# Patient Record
Sex: FEMALE | Race: OTHER | NOT HISPANIC OR LATINO | URBAN - METROPOLITAN AREA
[De-identification: names, ages, dates, MRNs, and addresses within clinical notes are randomized per-mention and may not be internally consistent; named-entity substitution may affect disease eponyms.]

---

## 2023-01-13 ENCOUNTER — EMERGENCY (EMERGENCY)
Facility: HOSPITAL | Age: 43
LOS: 1 days | Discharge: HOME | End: 2023-01-13
Attending: EMERGENCY MEDICINE
Payer: COMMERCIAL

## 2023-01-13 VITALS
OXYGEN SATURATION: 100 % | WEIGHT: 166.01 LBS | TEMPERATURE: 98 F | RESPIRATION RATE: 22 BRPM | SYSTOLIC BLOOD PRESSURE: 107 MMHG | DIASTOLIC BLOOD PRESSURE: 67 MMHG | HEIGHT: 60 IN | HEART RATE: 80 BPM

## 2023-01-13 PROCEDURE — 99284 EMERGENCY DEPT VISIT MOD MDM: CPT

## 2023-01-13 PROCEDURE — 71046 X-RAY EXAM CHEST 2 VIEWS: CPT | Mod: 26

## 2023-01-13 RX ORDER — SODIUM CHLORIDE 9 MG/ML
1000 INJECTION INTRAMUSCULAR; INTRAVENOUS; SUBCUTANEOUS ONCE
Refills: 0 | Status: COMPLETED | OUTPATIENT
Start: 2023-01-13 | End: 2023-01-13

## 2023-01-13 RX ORDER — IPRATROPIUM/ALBUTEROL SULFATE 18-103MCG
3 AEROSOL WITH ADAPTER (GRAM) INHALATION ONCE
Refills: 0 | Status: COMPLETED | OUTPATIENT
Start: 2023-01-13 | End: 2023-01-13

## 2023-01-13 RX ADMIN — Medication 3 MILLILITER(S): at 23:17

## 2023-01-13 RX ADMIN — Medication 125 MILLIGRAM(S): at 23:26

## 2023-01-13 RX ADMIN — SODIUM CHLORIDE 1000 MILLILITER(S): 9 INJECTION INTRAMUSCULAR; INTRAVENOUS; SUBCUTANEOUS at 23:26

## 2023-01-13 RX ADMIN — Medication 3 MILLILITER(S): at 23:05

## 2023-01-13 RX ADMIN — Medication 3 MILLILITER(S): at 23:31

## 2023-01-13 NOTE — ED PROVIDER NOTE - ATTENDING CONTRIBUTION TO CARE
Attending MD Kee:  I performed a history and physical exam of the patient and discussed their management with the resident. I reviewed the resident's note and agree with the documented findings and plan of care. My medical decision making and observations are found above. Attending MD Army:  I performed a history and physical exam of the patient and discussed their management with the resident. I reviewed the resident's note and agree with the documented findings and plan of care. My medical decision making and observations are found above.    -Jere Chandler MD-   sob  no wheeze  mdm: pt wo risk for pe, not obviously asthma, may be pulm htn / alt cause if not better w nebs  ekg wo stemi to my eye  xr wo pna to my eye  cont to re-assess

## 2023-01-13 NOTE — ED PROVIDER NOTE - PHYSICAL EXAMINATION
Gen: NAD, non-toxic appearing  Head: normal appearing  HEENT: normal conjunctiva  Lung: no respiratory distress, speaking in full sentences,   No orlando wheeze, felt to be moving poor air.     CV: regular rate and rhythm, no murmurs  Abd: soft, non distended, non tender   MSK: no visible deformities, No calf tenderness  Neuro: alert and grossly oriented, no gross motor deficits  Skin: No orlando rashes

## 2023-01-13 NOTE — ED PROVIDER NOTE - EKG ADDITIONAL INFORMATION FREE TEXT
Normal sinus rhythm.  Unremarkable intervals.  The P wave morphology is unremarkable.  The QRS morphology is unremarkable.  No rolando ischemic changes, there is nonspecific T wave flattening in lead III and aVL.  No evidence of old infarction.

## 2023-01-13 NOTE — ED PROVIDER NOTE - NSFOLLOWUPINSTRUCTIONS_ED_ALL_ED_FT
Follow-up with the pulmonology clinic.  You will receive a call in 24 to 48 hours to make this appointment.  Call the emergency department if you have difficulty obtaining this appointment.  The phone number for the emergency department can be found on the upper left-hand side of this paperwork.    Immediately return to the Emergency Department for any new or markedly worsening symptoms.     the prescription for prednisone sent to your pharmacy within the next 12 hours. Take all new and previous medications as prescribed.

## 2023-01-13 NOTE — ED PROVIDER NOTE - RAPID ASSESSMENT
42F w/ PMHx of asthma presents to ED c/o difficulty breathing since this past 3-4 hours. Pt used her pump twice and states she has no relief. Pt denies recent travel, steroids, smoking, being on birth control.     Patient was rapidly assessed via a rapid medical evaluation and/or role of Quick Triage Doctor; a limited history, physical exam and assessment was performed. The patient will be seen and further evaluated in the main emergency department. The remainder of care and evaluation will be conducted by the primary emergency medicine team. Receiving team will follow up on labs, imaging and serially reassess patient as indicated. All further decisions regarding patient care, evaluation and disposition are at the discretion of the receiving primary emergency department team. Seen by Jere Chandler (MD) and Khalida Alvarado). 42F w/ PMHx of asthma presents to ED c/o difficulty breathing since this past 3-4 hours. Pt used her pump twice and states she has no relief. Pt denies recent travel, steroids, smoking, being on birth control.     Patient was rapidly assessed via a rapid medical evaluation and/or role of Quick Triage Doctor; a limited history, physical exam and assessment was performed. The patient will be seen and further evaluated in the main emergency department. The remainder of care and evaluation will be conducted by the primary emergency medicine team. Receiving team will follow up on labs, imaging and serially reassess patient as indicated. All further decisions regarding patient care, evaluation and disposition are at the discretion of the receiving primary emergency department team. Seen by Jere Chandler (MD) and Khalida Alvarado (Scribe).    The scribe's documentation has been prepared under my direction and personally reviewed by me in its entirety. I confirm that the note above accurately reflects all work, treatment, procedures, and medical decision making performed by me. -SANDRO Chandler-

## 2023-01-13 NOTE — ED ADULT NURSE NOTE - OBJECTIVE STATEMENT
42 year old female, A&ox4, PMH of asthma presenting to ED complaining of shortness of breath. Patient is treated outpatient for asthma and has asthma undercontrol. Patient started to experience shortness of breath starting this morning. Patient denies any sick contact and flu like symptoms. Patient denies CP, abdominal pain, N/V/D at this time. Patient has had an asthma exacerbation in the past and states her current symptoms feel very similar.  Heart rate and rhythm regular. Respirations clear and equal bilaterally. Pursed lip breathing noted. Patient SPO2 100% on room air. Abdomen soft, nontender and nondistended. Peripheral pulses strong and equal bilaterally. IV placed and labs drawn. Safety and comfort measures maintained.

## 2023-01-13 NOTE — ED PROVIDER NOTE - CLINICAL SUMMARY MEDICAL DECISION MAKING FREE TEXT BOX
This is a 48-year-old female with a chief complaint of shortness of breath.  She is not hypoxic.  Tachypnea.  No orlando encephalopathy.  Clear lungs on auscultation call into question the empiric diagnosis of acute asthma exacerbation, will treat initially for same and reassess, will expand work-up if patient is not markedly symptomatically improved.  She is PERC negative, low pretest probability for pulmonary embolism.  She has no history of major HA risk factors for MI or history of MI in the family, low risk for acute cardiac injury at this time.  No acute bleeding or neurogenic signs to suggest hematologic or neurogenic sources of dyspnea.  Chest x-ray to assess for focal consolidation as cause of acute asthma exacerbation.  CMP, CBC, hCG.  Steroids and duo nebs.

## 2023-01-13 NOTE — ED PROVIDER NOTE - PATIENT PORTAL LINK FT
You can access the FollowMyHealth Patient Portal offered by [facility] by registering at the following website: [email or web address]. By joining [facility]’s FollowMyHealth portal, you will also be able to view your health information using other applications (apps) compatible with our system.

## 2023-01-13 NOTE — ED PROVIDER NOTE - OBJECTIVE STATEMENT
42-year-old female, with a history of asthma, presenting with chief complaint of shortness of breath.  Onset this morning.  No remarkable preceding events.  She has no associated chest pain with this.  No associated fevers, chills, cough, hemoptysis, chest pain, abdominal pain, nausea, vomiting, rash, calf tenderness.   no allergies to medications.  No regular medications aside from as needed albuterol.  She states that she currently feels like she is in an asthma exacerbation.  No history of intubations or admissions for asthma exacerbation in the past. no history of recent travels, VTE, recent diagnosis of cancer surgery, OCP use.

## 2023-01-13 NOTE — ED PROVIDER NOTE - PROGRESS NOTE DETAILS
Ashok Elmore MD (PGY-2): Patient asymptomatic following nebulized treatments.  Steroids given.  Chest x-ray is unremarkable.  Serology unremarkable.  She feels well and wishes to go home.  Will discharge on short course of steroids for acute asthma exacerbation.  Pulmonology follow-up.  Patient happy and agreeable plan of care.  ED return precautions discussed.

## 2023-01-14 VITALS
TEMPERATURE: 98 F | OXYGEN SATURATION: 100 % | DIASTOLIC BLOOD PRESSURE: 73 MMHG | RESPIRATION RATE: 18 BRPM | HEART RATE: 73 BPM | SYSTOLIC BLOOD PRESSURE: 107 MMHG

## 2023-01-14 LAB
ALBUMIN SERPL ELPH-MCNC: 4.3 G/DL — SIGNIFICANT CHANGE UP (ref 3.3–5)
ALP SERPL-CCNC: 70 U/L — SIGNIFICANT CHANGE UP (ref 40–120)
ALT FLD-CCNC: 14 U/L — SIGNIFICANT CHANGE UP (ref 10–45)
ANION GAP SERPL CALC-SCNC: 12 MMOL/L — SIGNIFICANT CHANGE UP (ref 5–17)
AST SERPL-CCNC: 12 U/L — SIGNIFICANT CHANGE UP (ref 10–40)
BASOPHILS # BLD AUTO: 0.05 K/UL — SIGNIFICANT CHANGE UP (ref 0–0.2)
BASOPHILS NFR BLD AUTO: 0.7 % — SIGNIFICANT CHANGE UP (ref 0–2)
BILIRUB SERPL-MCNC: 0.7 MG/DL — SIGNIFICANT CHANGE UP (ref 0.2–1.2)
BUN SERPL-MCNC: 12 MG/DL — SIGNIFICANT CHANGE UP (ref 7–23)
CALCIUM SERPL-MCNC: 9.4 MG/DL — SIGNIFICANT CHANGE UP (ref 8.4–10.5)
CHLORIDE SERPL-SCNC: 107 MMOL/L — SIGNIFICANT CHANGE UP (ref 96–108)
CO2 SERPL-SCNC: 24 MMOL/L — SIGNIFICANT CHANGE UP (ref 22–31)
CREAT SERPL-MCNC: 0.62 MG/DL — SIGNIFICANT CHANGE UP (ref 0.5–1.3)
EGFR: 115 ML/MIN/1.73M2 — SIGNIFICANT CHANGE UP
EOSINOPHIL # BLD AUTO: 0.35 K/UL — SIGNIFICANT CHANGE UP (ref 0–0.5)
EOSINOPHIL NFR BLD AUTO: 5 % — SIGNIFICANT CHANGE UP (ref 0–6)
GLUCOSE SERPL-MCNC: 99 MG/DL — SIGNIFICANT CHANGE UP (ref 70–99)
HCG SERPL-ACNC: <2 MIU/ML — SIGNIFICANT CHANGE UP
HCT VFR BLD CALC: 39.6 % — SIGNIFICANT CHANGE UP (ref 34.5–45)
HGB BLD-MCNC: 13.3 G/DL — SIGNIFICANT CHANGE UP (ref 11.5–15.5)
IMM GRANULOCYTES NFR BLD AUTO: 0.1 % — SIGNIFICANT CHANGE UP (ref 0–0.9)
LYMPHOCYTES # BLD AUTO: 2.04 K/UL — SIGNIFICANT CHANGE UP (ref 1–3.3)
LYMPHOCYTES # BLD AUTO: 28.9 % — SIGNIFICANT CHANGE UP (ref 13–44)
MCHC RBC-ENTMCNC: 27.7 PG — SIGNIFICANT CHANGE UP (ref 27–34)
MCHC RBC-ENTMCNC: 33.6 GM/DL — SIGNIFICANT CHANGE UP (ref 32–36)
MCV RBC AUTO: 82.5 FL — SIGNIFICANT CHANGE UP (ref 80–100)
MONOCYTES # BLD AUTO: 0.8 K/UL — SIGNIFICANT CHANGE UP (ref 0–0.9)
MONOCYTES NFR BLD AUTO: 11.3 % — SIGNIFICANT CHANGE UP (ref 2–14)
NEUTROPHILS # BLD AUTO: 3.82 K/UL — SIGNIFICANT CHANGE UP (ref 1.8–7.4)
NEUTROPHILS NFR BLD AUTO: 54 % — SIGNIFICANT CHANGE UP (ref 43–77)
NRBC # BLD: 0 /100 WBCS — SIGNIFICANT CHANGE UP (ref 0–0)
PLATELET # BLD AUTO: 327 K/UL — SIGNIFICANT CHANGE UP (ref 150–400)
POTASSIUM SERPL-MCNC: 3.7 MMOL/L — SIGNIFICANT CHANGE UP (ref 3.5–5.3)
POTASSIUM SERPL-SCNC: 3.7 MMOL/L — SIGNIFICANT CHANGE UP (ref 3.5–5.3)
PROT SERPL-MCNC: 7.2 G/DL — SIGNIFICANT CHANGE UP (ref 6–8.3)
RAPID RVP RESULT: SIGNIFICANT CHANGE UP
RBC # BLD: 4.8 M/UL — SIGNIFICANT CHANGE UP (ref 3.8–5.2)
RBC # FLD: 12.8 % — SIGNIFICANT CHANGE UP (ref 10.3–14.5)
SARS-COV-2 RNA SPEC QL NAA+PROBE: SIGNIFICANT CHANGE UP
SODIUM SERPL-SCNC: 143 MMOL/L — SIGNIFICANT CHANGE UP (ref 135–145)
WBC # BLD: 7.07 K/UL — SIGNIFICANT CHANGE UP (ref 3.8–10.5)
WBC # FLD AUTO: 7.07 K/UL — SIGNIFICANT CHANGE UP (ref 3.8–10.5)

## 2023-01-14 PROCEDURE — 84702 CHORIONIC GONADOTROPIN TEST: CPT

## 2023-01-14 PROCEDURE — 99285 EMERGENCY DEPT VISIT HI MDM: CPT | Mod: 25

## 2023-01-14 PROCEDURE — 85025 COMPLETE CBC W/AUTO DIFF WBC: CPT

## 2023-01-14 PROCEDURE — 71046 X-RAY EXAM CHEST 2 VIEWS: CPT

## 2023-01-14 PROCEDURE — 96374 THER/PROPH/DIAG INJ IV PUSH: CPT

## 2023-01-14 PROCEDURE — 36415 COLL VENOUS BLD VENIPUNCTURE: CPT

## 2023-01-14 PROCEDURE — 93005 ELECTROCARDIOGRAM TRACING: CPT

## 2023-01-14 PROCEDURE — 94640 AIRWAY INHALATION TREATMENT: CPT

## 2023-01-14 PROCEDURE — 0225U NFCT DS DNA&RNA 21 SARSCOV2: CPT

## 2023-01-14 PROCEDURE — 80053 COMPREHEN METABOLIC PANEL: CPT

## 2023-01-14 RX ORDER — ALBUTEROL 90 UG/1
1 AEROSOL, METERED ORAL ONCE
Refills: 0 | Status: COMPLETED | OUTPATIENT
Start: 2023-01-14 | End: 2023-01-14

## 2023-01-14 RX ADMIN — ALBUTEROL 1 PUFF(S): 90 AEROSOL, METERED ORAL at 02:08

## 2023-01-31 ENCOUNTER — APPOINTMENT (OUTPATIENT)
Dept: UROGYNECOLOGY | Facility: CLINIC | Age: 43
End: 2023-01-31
Payer: COMMERCIAL

## 2023-01-31 VITALS
HEIGHT: 60 IN | HEART RATE: 83 BPM | DIASTOLIC BLOOD PRESSURE: 74 MMHG | BODY MASS INDEX: 31.22 KG/M2 | SYSTOLIC BLOOD PRESSURE: 122 MMHG | WEIGHT: 159 LBS

## 2023-01-31 DIAGNOSIS — N39.3 STRESS INCONTINENCE (FEMALE) (MALE): ICD-10-CM

## 2023-01-31 DIAGNOSIS — N81.6 RECTOCELE: ICD-10-CM

## 2023-01-31 LAB
BILIRUB UR QL STRIP: NORMAL
CLARITY UR: CLEAR
COLLECTION METHOD: NORMAL
GLUCOSE UR-MCNC: NORMAL
HCG UR QL: 0.2 EU/DL
HGB UR QL STRIP.AUTO: NORMAL
KETONES UR-MCNC: NORMAL
LEUKOCYTE ESTERASE UR QL STRIP: NORMAL
NITRITE UR QL STRIP: NORMAL
PH UR STRIP: 5.5
PROT UR STRIP-MCNC: NORMAL
SP GR UR STRIP: 1.02

## 2023-01-31 PROCEDURE — 51701 INSERT BLADDER CATHETER: CPT

## 2023-01-31 PROCEDURE — 99204 OFFICE O/P NEW MOD 45 MIN: CPT | Mod: 25

## 2023-01-31 NOTE — PROCEDURE
[Straight Catheterization] : insertion of a straight catheter [Urinary Frequency] : urinary frequency [Patient] : the patient [Allergies Reviewed] : Allergies reviewed [None] : none [___ Fr Straight Tip] : a [unfilled] in [place] straight tip catheter [No Complications] : no complications [Tolerated Well] : the patient tolerated the procedure well [Post procedure instructions and information given] : Post procedure instructions and information were given and reviewed with patient. [0] : 0

## 2023-01-31 NOTE — PHYSICAL EXAM
[Aa ____] : Aa [unfilled] [Ba ____] : Ba [unfilled] [C ____] : C [unfilled] [GH ____] : GH [unfilled] [PB ____] : PB [unfilled] [TVL ____] : TVL  [unfilled] [Ap ____] : Ap [unfilled] [Bp ____] : Bp [unfilled] [D ____] : D [unfilled] [Post Void Residual ____ml] : post void residual was [unfilled] ml [FreeTextEntry1] : General: Well, appearing, no acute distress\par HEENT: Normocephalic, atraumatic\par Respiratory: Speaking in full sentences comfortably, normal work of breathing and no cough during visit\par Extremities: No upper extremity edema noted\par Skin: No obvious rash or skin lesions\par Neuro: Alert and oriented x 3, speech is fluent, normal rate\par Psych: Normal mood and affect [Labia Majora] : were normal [Labia Minora] : were normal [Normal Appearance] : general appearance was normal [Pink Rugae] : pink rugae [Attentuated] : perineal body was attenuated [Normal] : normal [FreeTextEntry3] : (+) hypermobility, (-) CST on empty bladder

## 2023-01-31 NOTE — HISTORY OF PRESENT ILLNESS
[FreeTextEntry1] : FAIZA ARRIOLA  is a 42 year old P3 presenting for evaluation of stress urinary incontinence since the birth of her children. Leaks with exercise, coughing, laughing, sneezing. Also has a sensation of vaginal laxity, particularly during sexual activity. She denies any complaints of urgency, frequency or urgency urinary incontinence. Also denies any problems with vaginal bulge sensation. Denies any constipation.\par \par Daily fluid intake:\par Water: 1/2-1 gallon\par Coffee: 1 cup (decaf)\par \par PMH: asthma\par PSH: C/S x 1\par OBGYN Hx:  x 2, C/S x 1\par Social Hx: non-smoker

## 2023-01-31 NOTE — DISCUSSION/SUMMARY
[FreeTextEntry1] : #Stress urinary incontinence. Negative CST on exam today, normal PVR. We discussed the above diagnosis using images to demonstrate the likely etiology. Management options were reviewed which included observation, pelvic floor exercises or physical therapy, weight loss, intravaginal incontinence device (pessary, Poise Impressa), periurethral bulking agents, and surgery. She is most likely interested in sling.\par - RTO for UDS\par \par #Stage II rectocele: we discussed management alternatives and she is interested in posterior repair with perineorrhaphy. Risks of procedure, including pain, dyspareunia discussed. Will discuss further at the next visit after UDS.\par \par [facility] patient handouts on MUS and posterior repair we given to patient.\par

## 2023-02-02 DIAGNOSIS — Z87.09 PERSONAL HISTORY OF OTHER DISEASES OF THE RESPIRATORY SYSTEM: ICD-10-CM

## 2023-02-02 RX ORDER — PHENTERMINE HYDROCHLORIDE 37.5 MG/1
CAPSULE ORAL
Refills: 0 | Status: ACTIVE | COMMUNITY

## 2023-02-28 ENCOUNTER — APPOINTMENT (OUTPATIENT)
Dept: UROGYNECOLOGY | Facility: CLINIC | Age: 43
End: 2023-02-28

## 2023-03-02 ENCOUNTER — APPOINTMENT (OUTPATIENT)
Dept: UROGYNECOLOGY | Facility: CLINIC | Age: 43
End: 2023-03-02

## 2024-07-29 ENCOUNTER — NON-APPOINTMENT (OUTPATIENT)
Age: 44
End: 2024-07-29

## 2024-07-29 NOTE — ED ADULT NURSE NOTE - IS THE PATIENT ABLE TO BE SCREENED?
-- DO NOT REPLY / DO NOT REPLY ALL --  -- This inbox is not monitored  -- Message is from [facility] ([facility]) --      Message Type:  Refill Medication   Refill request for Pended medication named:   Preferred pharmacy verified, and selected.   [id number] - [address]    Is the patient OUT of Medication?  Yes and Medication Refills handled by [facility] Clinical        Message:                    Yes

## 2025-02-27 ENCOUNTER — NON-APPOINTMENT (OUTPATIENT)
Age: 45
End: 2025-02-27